# Patient Record
Sex: FEMALE | Race: WHITE | Employment: OTHER | URBAN - METROPOLITAN AREA
[De-identification: names, ages, dates, MRNs, and addresses within clinical notes are randomized per-mention and may not be internally consistent; named-entity substitution may affect disease eponyms.]

---

## 2017-04-06 ENCOUNTER — TELEPHONE (OUTPATIENT)
Dept: NEUROLOGY | Facility: CLINIC | Age: 82
End: 2017-04-06

## 2017-04-06 NOTE — TELEPHONE ENCOUNTER
Spoke with daughter Juan C Gruber [facility] [place] SCHWAB. Juan C Gruber states patient is in [place] and is returning the week of April 17 th.  Juan C Gruber notes patient has been complaining of facial pain a lot and was seen by a dentist in [facility]. Patient was told she may have

## 2017-04-25 ENCOUNTER — OFFICE VISIT (OUTPATIENT)
Dept: NEUROLOGY | Facility: CLINIC | Age: 82
End: 2017-04-25

## 2017-04-25 VITALS
SYSTOLIC BLOOD PRESSURE: 160 MMHG | BODY MASS INDEX: 28 KG/M2 | RESPIRATION RATE: 14 BRPM | WEIGHT: 160 LBS | HEART RATE: 84 BPM | DIASTOLIC BLOOD PRESSURE: 80 MMHG

## 2017-04-25 DIAGNOSIS — G50.0 TRIGEMINAL NEURALGIA: Primary | ICD-10-CM

## 2017-04-25 PROCEDURE — 99215 OFFICE O/P EST HI 40 MIN: CPT | Performed by: OTHER

## 2017-04-25 RX ORDER — CARBAMAZEPINE 100 MG/1
TABLET, CHEWABLE ORAL
Qty: 90 TABLET | Refills: 2 | Status: SHIPPED | OUTPATIENT
Start: 2017-04-25 | End: 2018-09-06

## 2017-04-25 RX ORDER — ATORVASTATIN CALCIUM 20 MG/1
10 TABLET, FILM COATED ORAL NIGHTLY
COMMUNITY
End: 2021-02-02 | Stop reason: ALTCHOICE

## 2017-04-25 RX ORDER — IBUPROFEN 800 MG/1
800 TABLET ORAL EVERY 8 HOURS PRN
COMMUNITY
End: 2021-02-02 | Stop reason: ALTCHOICE

## 2017-04-25 RX ORDER — AMOXICILLIN 500 MG/1
500 TABLET, FILM COATED ORAL EVERY 8 HOURS
COMMUNITY
End: 2021-02-02 | Stop reason: ALTCHOICE

## 2017-04-25 RX ORDER — ENALAPRIL MALEATE 5 MG/1
5 TABLET ORAL 2 TIMES DAILY
COMMUNITY
End: 2021-02-02 | Stop reason: ALTCHOICE

## 2017-04-25 NOTE — PROGRESS NOTES
[facility] Neurology outpatient progress note  Date of service: 4/25/2017    Patient here to follow up regarding trigeminal neuralgia.  present for visit. States pain has increased, but has been out of medication for approx 1 week.  Here to discuss the tobacco: Never Used    Alcohol Use: Yes  0.0 oz/week    0 Standard drinks or equivalent per week         Comment: rare     Family History   Problem Relation Age of Onset   • Cancer Other      Family Hx   Neurological examination:  /80 mmHg  Pulse 84

## 2017-04-25 NOTE — PROGRESS NOTES
Patient here to follow up regarding trigeminal neuralgia.  present for visit. States pain has increased, but has been out of medication for approx 1 week. Here to discuss the next steps.

## 2017-04-25 NOTE — PATIENT INSTRUCTIONS
Refill policies:    • Allow 2 business days for refills; controlled substances may take longer.   • Contact your pharmacy at least 5 days prior to running out of medication and have them send an electronic request or submit request through the “request re insurance carrier to obtain pre-certification or prior authorization. Unfortunately, [facility] has seen an increase in denial of payment even though the procedure/test has been pre-certified.   You are strongly encouraged to contact your insurance carrier to v

## 2017-06-28 ENCOUNTER — OFFICE VISIT (OUTPATIENT)
Dept: NEUROLOGY | Facility: CLINIC | Age: 82
End: 2017-06-28

## 2017-06-28 VITALS
BODY MASS INDEX: 30 KG/M2 | SYSTOLIC BLOOD PRESSURE: 128 MMHG | DIASTOLIC BLOOD PRESSURE: 70 MMHG | RESPIRATION RATE: 16 BRPM | WEIGHT: 170 LBS | HEART RATE: 78 BPM

## 2017-06-28 DIAGNOSIS — G50.0 TRIGEMINAL NEURALGIA: Primary | ICD-10-CM

## 2017-06-28 PROCEDURE — 99215 OFFICE O/P EST HI 40 MIN: CPT | Performed by: OTHER

## 2017-06-28 NOTE — PATIENT INSTRUCTIONS
Refill policies:    • Allow 2-3 business days for refills; controlled substances may take longer.   • Contact your pharmacy at least 5 days prior to running out of medication and have them send an electronic request or submit request through the [facility] have a procedure or additional testing performed. [facility]) will contact your insurance carrier to obtain pre-certification or prior authorization.     Unfortunately, [facility] has seen an increase in denial of payment even though the p

## 2017-06-28 NOTE — PROGRESS NOTES
[facility] Neurology outpatient progress note  Date of service: 6/28/2017    Patient here to follow up regarding trigeminal neuralgia.  utlized for visit. States pain has not improved. Here to discuss the next steps.   pain on the R side of her face-upp Smokeless tobacco: Never Used    Comment: Quit in 1976    Alcohol use Yes  0.6 oz/week    1 Standard drinks or equivalent per week         Comment: rare     Family History   Problem Relation Age of Onset   • Cancer Other      Family Hx   Neurological ex

## 2017-07-03 ENCOUNTER — HOSPITAL ENCOUNTER (OUTPATIENT)
Dept: MRI IMAGING | Age: 82
Discharge: HOME | End: 2017-07-03
Attending: Other
Payer: MEDICARE

## 2017-07-03 DIAGNOSIS — G50.0 TRIGEMINAL NEURALGIA: ICD-10-CM

## 2017-07-03 PROCEDURE — 70553 MRI BRAIN STEM W/O & W/DYE: CPT | Performed by: OTHER

## 2017-07-03 PROCEDURE — 70546 MR ANGIOGRAPH HEAD W/O&W/DYE: CPT | Performed by: OTHER

## 2017-07-03 PROCEDURE — A9575 INJ GADOTERATE MEGLUMI 0.1ML: HCPCS | Performed by: OTHER

## 2017-07-05 ENCOUNTER — OFFICE VISIT (OUTPATIENT)
Dept: SURGERY | Facility: CLINIC | Age: 82
End: 2017-07-05

## 2017-07-05 VITALS
WEIGHT: 170 LBS | HEIGHT: 64 IN | BODY MASS INDEX: 29.02 KG/M2 | SYSTOLIC BLOOD PRESSURE: 124 MMHG | DIASTOLIC BLOOD PRESSURE: 66 MMHG | HEART RATE: 86 BPM

## 2017-07-05 DIAGNOSIS — G50.0 TRIGEMINAL NEURALGIA: Primary | ICD-10-CM

## 2017-07-05 PROCEDURE — 99213 OFFICE O/P EST LOW 20 MIN: CPT | Performed by: PHYSICIAN ASSISTANT

## 2017-07-05 NOTE — PROGRESS NOTES
Intermittent facial pain over last 2-3 years has increased in frequency and is right sided facial pain. Had MVD in 2012 by Dr. Kerrie Watt and was pain free for 3 years. New MRI for review.

## 2017-07-05 NOTE — PROGRESS NOTES
Neurosurgery Clinic Visit  2017    Lali Han PCP:  Edilberto Andrade MD    1935 MRN [id number]       CC: Trigeminal Neuralgia    HPI:    Patient is a very pleasant 81year old female who presents with right facial pain.   She had right V2 pain d midline, facial sensation and expression symmetric, normal voice    Upper extremity strength:      Deltoid  Biceps  Triceps   W.flexion  W.extension    Finger abduction     Right 5 5 5 5  5 5 5     Left 5 5 5 5 5 5 5     Lower extremity strength:

## 2017-07-05 NOTE — PATIENT INSTRUCTIONS
Refill policies:    • Allow 2-3 business days for refills; controlled substances may take longer.   • Contact your pharmacy at least 5 days prior to running out of medication and have them send an electronic request or submit request through the [facility] have a procedure or additional testing performed. [facility]) will contact your insurance carrier to obtain pre-certification or prior authorization.     Unfortunately, [facility] has seen an increase in denial of payment even though the p

## 2018-06-18 ENCOUNTER — PATIENT MESSAGE (OUTPATIENT)
Dept: NEUROLOGY | Facility: CLINIC | Age: 83
End: 2018-06-18

## 2018-06-18 NOTE — TELEPHONE ENCOUNTER
Per Dr Washington Grade notes, 9/15/15,     HPI: Koffi Cedeno is a 79year old female with PMH of right trigeminal neuralgia s/p MVD in 2012 by Dr Nighat Rojas presents here for initial evaluation; Patient here for evaluation of trigeminal neuralgia that was origin

## 2018-06-18 NOTE — TELEPHONE ENCOUNTER
From: Radha Don  To: Emi Livingston MD  Sent: 6/18/2018 9:03 AM CDT  Subject: Non-Urgent Medical Question    Good morning,   Is there a possibility that you can tell me when my Trigeminal Neuralgia was 1st diagnosed.  I remember seeing Dr. Melvin Saba (no

## 2018-09-06 ENCOUNTER — TELEPHONE (OUTPATIENT)
Dept: NEUROLOGY | Facility: CLINIC | Age: 83
End: 2018-09-06

## 2018-09-06 DIAGNOSIS — G50.0 TRIGEMINAL NEURALGIA: ICD-10-CM

## 2018-09-06 RX ORDER — CARBAMAZEPINE 100 MG/1
TABLET, CHEWABLE ORAL
Qty: 90 TABLET | Refills: 0 | Status: SHIPPED | OUTPATIENT
Start: 2018-09-06 | End: 2020-11-10

## 2018-09-06 NOTE — TELEPHONE ENCOUNTER
Carbamazepine last refilled 4/25/17    Noted patient is overdue for a follow up appointment. Last seen 6/28/17. No future appts scheduled. Left a message for patient's daughter, Katerine Sharma to call back.      When returns call please help schedule a follow up

## 2018-09-06 NOTE — TELEPHONE ENCOUNTER
Medication: Carbamazepine 100 mg      Date of last refill: 04/25/17 (#90/2)  [place]    Last office visit: 6/28/17  Due back to clinic per last office note: 2-3 months   Date next office visit scheduled:  No future ap

## 2018-09-06 NOTE — TELEPHONE ENCOUNTER
Daughter verified it the # given for pharmacy is correct and it is for the Carbamazepine. Transferred to  to schedule follow up. Daughter verbalized understanding no further refills until follow up.

## 2018-09-06 NOTE — TELEPHONE ENCOUNTER
Rx for Carbamazepine sent to [facility]. Receipt confirmed by pharmacy (9/6/2018  4:11 PM CDT)

## 2018-12-05 ENCOUNTER — TELEPHONE (OUTPATIENT)
Dept: INTERNAL MEDICINE CLINIC | Facility: CLINIC | Age: 83
End: 2018-12-05

## 2018-12-05 NOTE — TELEPHONE ENCOUNTER
Spoke with daughter to set up medicare annual well visit. Daughter declined setting up an appointment. She stated that she found a new PCP in [facility]. Info given to Brigette Watkins supervisor to remove Dr. Harshil Denson as PCP.

## 2019-03-28 ENCOUNTER — MED REC SCAN ONLY (OUTPATIENT)
Dept: INTERNAL MEDICINE CLINIC | Facility: CLINIC | Age: 84
End: 2019-03-28

## 2020-10-30 ENCOUNTER — PATIENT MESSAGE (OUTPATIENT)
Dept: NEUROLOGY | Facility: CLINIC | Age: 85
End: 2020-10-30

## 2020-10-30 NOTE — TELEPHONE ENCOUNTER
From: Adis Hardy  To: Danni Palacios MD  Sent: 10/30/2020 10:17 AM CDT  Subject: Other    Hi, Adis Antony has an appt Nov. 3rd due to reoccurrence facial pain. She is coming in from [place] for this appt.  Is there any chance that you will be orderi

## 2020-10-30 NOTE — TELEPHONE ENCOUNTER
I have not seen pt over 3 years,i.e.. no longer a established pt, in our office that means a new consult is necessary, I prefer to evaluate pt first before I put in any orders.  Please feel free to contact your PCP or other specialists if they can help put

## 2020-11-02 NOTE — TELEPHONE ENCOUNTER
Called pt's daughter to update insurance information; also changed appt to 11/10/20 for an Established Exam 40 min with Dr. Eugenio Canada in [place] office. Pt's daughter appreciative, she is able to attend the visit with her mom.

## 2020-11-10 ENCOUNTER — HOSPITAL ENCOUNTER (OUTPATIENT)
Dept: MRI IMAGING | Facility: HOSPITAL | Age: 85
Discharge: HOME | End: 2020-11-10
Attending: Other
Payer: MEDICARE

## 2020-11-10 ENCOUNTER — OFFICE VISIT (OUTPATIENT)
Dept: NEUROLOGY | Facility: CLINIC | Age: 85
End: 2020-11-10
Payer: MEDICARE

## 2020-11-10 VITALS
RESPIRATION RATE: 14 BRPM | SYSTOLIC BLOOD PRESSURE: 138 MMHG | DIASTOLIC BLOOD PRESSURE: 78 MMHG | HEART RATE: 68 BPM | BODY MASS INDEX: 29 KG/M2 | WEIGHT: 170 LBS

## 2020-11-10 DIAGNOSIS — G50.0 TRIGEMINAL NEURALGIA: Primary | ICD-10-CM

## 2020-11-10 DIAGNOSIS — G50.0 TRIGEMINAL NEURALGIA: ICD-10-CM

## 2020-11-10 PROCEDURE — 82565 ASSAY OF CREATININE: CPT

## 2020-11-10 PROCEDURE — A9575 INJ GADOTERATE MEGLUMI 0.1ML: HCPCS | Performed by: OTHER

## 2020-11-10 PROCEDURE — 99204 OFFICE O/P NEW MOD 45 MIN: CPT | Performed by: OTHER

## 2020-11-10 PROCEDURE — 70546 MR ANGIOGRAPH HEAD W/O&W/DYE: CPT | Performed by: OTHER

## 2020-11-10 PROCEDURE — 70553 MRI BRAIN STEM W/O & W/DYE: CPT | Performed by: OTHER

## 2020-11-10 RX ORDER — CARBAMAZEPINE 100 MG/1
100 TABLET, EXTENDED RELEASE ORAL 3 TIMES DAILY
Qty: 90 TABLET | Refills: 2 | Status: SHIPPED | OUTPATIENT
Start: 2020-11-10 | End: 2020-11-10

## 2020-11-10 RX ORDER — LISINOPRIL 20 MG/1
10 TABLET ORAL DAILY
COMMUNITY
Start: 2019-05-31 | End: 2021-02-02 | Stop reason: ALTCHOICE

## 2020-11-10 RX ORDER — CARBAMAZEPINE 100 MG/1
100 TABLET, EXTENDED RELEASE ORAL 3 TIMES DAILY
Qty: 90 TABLET | Refills: 2 | Status: SHIPPED | OUTPATIENT
Start: 2020-11-10 | End: 2020-12-08

## 2020-11-10 NOTE — PROGRESS NOTES
[facility]    Date of consult: 11/10/2020    CC/Reason for consult: trigeminal neuralgia    HPI: Ricarda Lou is a 81year old female with PMH of right trigeminal neuralgia s/p MVD in 2012 by Dr Blayne Perez ; trigeminal neuralgia w Quit in 1976    Alcohol use:  Yes      Alcohol/week: 1.0 standard drinks      Types: 1 Standard drinks or equivalent per week      Comment: rare    Family History   Problem Relation Age of Onset   • Cancer Other         Family Hx      Physical Examination:

## 2020-12-07 ENCOUNTER — PATIENT MESSAGE (OUTPATIENT)
Dept: NEUROLOGY | Facility: CLINIC | Age: 85
End: 2020-12-07

## 2020-12-07 NOTE — TELEPHONE ENCOUNTER
Per L.O.V. 11/10/2020-    Assessment & Plan:  Trigeminal neuralgia  (primary encounter diagnosis); right ; worsening; right recurrent trigeminal neuralgia s/p MVD     Recommendations:  Restart tegretol 100 mg tid and may increase as needed  MRI /MRA trigem

## 2020-12-07 NOTE — TELEPHONE ENCOUNTER
From: Hugh Donohue  To: Fior Walsh MD  Sent: 12/7/2020 9:39 AM CST  Subject: Prescription Question    Good morning, My mom Hugh Jayde was instructed to call back in 3-4 weeks with an update on how the medication is working.   Per my mom, the m

## 2020-12-08 RX ORDER — CARBAMAZEPINE 100 MG/1
100 TABLET, EXTENDED RELEASE ORAL 4 TIMES DAILY
Qty: 120 TABLET | Refills: 2 | Status: SHIPPED | OUTPATIENT
Start: 2020-12-08 | End: 2021-02-02

## 2020-12-08 NOTE — TELEPHONE ENCOUNTER
Patient's daughter Shantel Patel notified (ok per HIPAA consent) that  Patient can take Carbamazepine 100mg QID.

## 2021-02-02 ENCOUNTER — OFFICE VISIT (OUTPATIENT)
Dept: NEUROLOGY | Facility: CLINIC | Age: 86
End: 2021-02-02
Payer: MEDICARE

## 2021-02-02 ENCOUNTER — OFFICE VISIT (OUTPATIENT)
Dept: INTERNAL MEDICINE CLINIC | Facility: CLINIC | Age: 86
End: 2021-02-02
Payer: MEDICARE

## 2021-02-02 VITALS
OXYGEN SATURATION: 98 % | WEIGHT: 168 LBS | HEIGHT: 62 IN | DIASTOLIC BLOOD PRESSURE: 80 MMHG | RESPIRATION RATE: 12 BRPM | HEART RATE: 67 BPM | SYSTOLIC BLOOD PRESSURE: 180 MMHG | BODY MASS INDEX: 30.91 KG/M2 | TEMPERATURE: 99 F

## 2021-02-02 VITALS
RESPIRATION RATE: 16 BRPM | WEIGHT: 168 LBS | SYSTOLIC BLOOD PRESSURE: 164 MMHG | BODY MASS INDEX: 29 KG/M2 | DIASTOLIC BLOOD PRESSURE: 78 MMHG | HEART RATE: 70 BPM

## 2021-02-02 DIAGNOSIS — E78.5 HYPERLIPIDEMIA, UNSPECIFIED HYPERLIPIDEMIA TYPE: ICD-10-CM

## 2021-02-02 DIAGNOSIS — I10 UNCONTROLLED HYPERTENSION: Primary | ICD-10-CM

## 2021-02-02 DIAGNOSIS — K14.6 TONGUE BURNING SENSATION: ICD-10-CM

## 2021-02-02 DIAGNOSIS — R20.2 NUMBNESS AND TINGLING OF RIGHT LOWER EXTREMITY: ICD-10-CM

## 2021-02-02 DIAGNOSIS — R20.0 NUMBNESS AND TINGLING OF RIGHT LOWER EXTREMITY: ICD-10-CM

## 2021-02-02 DIAGNOSIS — G50.0 TRIGEMINAL NEURALGIA OF RIGHT SIDE OF FACE: ICD-10-CM

## 2021-02-02 DIAGNOSIS — G50.0 TRIGEMINAL NEURALGIA: Primary | ICD-10-CM

## 2021-02-02 PROCEDURE — 99214 OFFICE O/P EST MOD 30 MIN: CPT | Performed by: OTHER

## 2021-02-02 PROCEDURE — 93000 ELECTROCARDIOGRAM COMPLETE: CPT | Performed by: PHYSICIAN ASSISTANT

## 2021-02-02 PROCEDURE — 99214 OFFICE O/P EST MOD 30 MIN: CPT | Performed by: PHYSICIAN ASSISTANT

## 2021-02-02 RX ORDER — CARBAMAZEPINE 100 MG/1
100 TABLET, EXTENDED RELEASE ORAL 3 TIMES DAILY
Qty: 270 TABLET | Refills: 3 | Status: SHIPPED | OUTPATIENT
Start: 2021-02-02

## 2021-02-02 RX ORDER — ATORVASTATIN CALCIUM 10 MG/1
10 TABLET, FILM COATED ORAL NIGHTLY
COMMUNITY

## 2021-02-02 RX ORDER — IBUPROFEN 200 MG
200 TABLET ORAL EVERY 6 HOURS PRN
COMMUNITY

## 2021-02-02 RX ORDER — LISINOPRIL 10 MG/1
20 TABLET ORAL DAILY
COMMUNITY
Start: 2021-02-02 | End: 2021-02-11

## 2021-02-02 RX ORDER — LISINOPRIL 10 MG/1
10 TABLET ORAL DAILY
COMMUNITY
End: 2021-02-02

## 2021-02-02 NOTE — PROGRESS NOTES
Courtney Deras is a 81year old female. HPI:   Patient presents for elevated BP. Accompanied today by her daughter who is translating.   patient spends much of the year living in [facility].  Recently home readings on wrist cuff have been consistent per week      Frequency: Monthly or less      Comment: rare    Drug use: No      Family History   Problem Relation Age of Onset   • Cancer Other         Family Hx        REVIEW OF SYSTEMS:   GENERAL HEALTH: denies fever, chills, night sweats  SKIN: denies folic acid. Neuro - Dr. Valdez Erp    The patient indicates understanding of these issues and agrees to the plan. The patient is asked to return here in 7-10 days for f/u of HTN.   Instructed to seek immediate attn if developing acute HA, new onset n

## 2021-02-02 NOTE — PROGRESS NOTES
[facility] Neurology outpatient progress note  Date of service: 2/2/2021    Patient here for a follow-up visit for right trigeminal neuralgia. Since last visit pain is about same, sometimes feels more electric sensation on right cheek.  Feels more when she is not Social History:  Social History    Tobacco Use      Smoking status: Former Smoker      Smokeless tobacco: Never Used      Tobacco comment: Quit in 1976    Alcohol use:  Yes      Alcohol/week: 1.0 standard drinks      Types: 1 Standard drinks or equivale /MRA trigeminal neuralgia protocol reviewed, overall stable study  PCP to follow re: elevated BP today, meds needed to be adjusted or go urgent care if not improving; advised pt and family  See orders and medications filed with this encounter.  The patient

## 2021-02-02 NOTE — PATIENT INSTRUCTIONS
High blood pressure:  - increase lisinopril to 20 mg daily (take TWO 10 mg tablets once a day)    Please use [employer] or call Suhail Solis at [phone] to set up the following tests:  - fasting blood work    Follow up visit in 1 wesiria

## 2021-02-03 DIAGNOSIS — Z23 NEED FOR VACCINATION: ICD-10-CM

## 2021-02-11 ENCOUNTER — LAB ENCOUNTER (OUTPATIENT)
Dept: LAB | Age: 86
End: 2021-02-11
Attending: FAMILY MEDICINE
Payer: MEDICARE

## 2021-02-11 ENCOUNTER — OFFICE VISIT (OUTPATIENT)
Dept: INTERNAL MEDICINE CLINIC | Facility: CLINIC | Age: 86
End: 2021-02-11
Payer: MEDICARE

## 2021-02-11 VITALS
TEMPERATURE: 98 F | DIASTOLIC BLOOD PRESSURE: 84 MMHG | HEART RATE: 87 BPM | OXYGEN SATURATION: 98 % | HEIGHT: 62 IN | SYSTOLIC BLOOD PRESSURE: 160 MMHG | BODY MASS INDEX: 31.32 KG/M2 | WEIGHT: 170.19 LBS | RESPIRATION RATE: 16 BRPM

## 2021-02-11 DIAGNOSIS — R20.0 NUMBNESS AND TINGLING OF RIGHT LEG: ICD-10-CM

## 2021-02-11 DIAGNOSIS — G50.0 TRIGEMINAL NEURALGIA OF RIGHT SIDE OF FACE: ICD-10-CM

## 2021-02-11 DIAGNOSIS — R20.2 NUMBNESS AND TINGLING OF RIGHT LEG: ICD-10-CM

## 2021-02-11 DIAGNOSIS — K14.6 TONGUE BURNING SENSATION: ICD-10-CM

## 2021-02-11 DIAGNOSIS — R20.2 NUMBNESS AND TINGLING OF RIGHT LOWER EXTREMITY: ICD-10-CM

## 2021-02-11 DIAGNOSIS — R20.0 NUMBNESS AND TINGLING OF RIGHT LOWER EXTREMITY: ICD-10-CM

## 2021-02-11 DIAGNOSIS — I10 UNCONTROLLED HYPERTENSION: ICD-10-CM

## 2021-02-11 DIAGNOSIS — M54.50 ACUTE BILATERAL LOW BACK PAIN WITHOUT SCIATICA: Primary | ICD-10-CM

## 2021-02-11 DIAGNOSIS — E78.5 HYPERLIPIDEMIA, UNSPECIFIED HYPERLIPIDEMIA TYPE: ICD-10-CM

## 2021-02-11 DIAGNOSIS — I10 BENIGN ESSENTIAL HYPERTENSION: ICD-10-CM

## 2021-02-11 LAB
ALT SERPL-CCNC: 22 U/L
ANION GAP SERPL CALC-SCNC: 2 MMOL/L (ref 0–18)
AST SERPL-CCNC: 17 U/L (ref 15–37)
BASOPHILS # BLD AUTO: 0.05 X10(3) UL (ref 0–0.2)
BASOPHILS NFR BLD AUTO: 0.9 %
BUN BLD-MCNC: 18 MG/DL (ref 7–18)
BUN/CREAT SERPL: 20.2 (ref 10–20)
CALCIUM BLD-MCNC: 9.4 MG/DL (ref 8.5–10.1)
CHLORIDE SERPL-SCNC: 111 MMOL/L (ref 98–112)
CHOLEST SMN-MCNC: 230 MG/DL (ref ?–200)
CO2 SERPL-SCNC: 27 MMOL/L (ref 21–32)
CREAT BLD-MCNC: 0.89 MG/DL
DEPRECATED RDW RBC AUTO: 40.6 FL (ref 35.1–46.3)
EOSINOPHIL # BLD AUTO: 0.15 X10(3) UL (ref 0–0.7)
EOSINOPHIL NFR BLD AUTO: 2.6 %
ERYTHROCYTE [DISTWIDTH] IN BLOOD BY AUTOMATED COUNT: 12 % (ref 11–15)
FOLATE SERPL-MCNC: 15 NG/ML (ref 8.7–?)
GLUCOSE BLD-MCNC: 95 MG/DL (ref 70–99)
HCT VFR BLD AUTO: 40.9 %
HDLC SERPL-MCNC: 72 MG/DL (ref 40–59)
HGB BLD-MCNC: 13.7 G/DL
IMM GRANULOCYTES # BLD AUTO: 0.02 X10(3) UL (ref 0–1)
IMM GRANULOCYTES NFR BLD: 0.3 %
LDLC SERPL CALC-MCNC: 130 MG/DL (ref ?–100)
LYMPHOCYTES # BLD AUTO: 1.44 X10(3) UL (ref 1–4)
LYMPHOCYTES NFR BLD AUTO: 24.7 %
MCH RBC QN AUTO: 31.1 PG (ref 26–34)
MCHC RBC AUTO-ENTMCNC: 33.5 G/DL (ref 31–37)
MCV RBC AUTO: 92.7 FL
MONOCYTES # BLD AUTO: 0.41 X10(3) UL (ref 0.1–1)
MONOCYTES NFR BLD AUTO: 7 %
NEUTROPHILS # BLD AUTO: 3.77 X10 (3) UL (ref 1.5–7.7)
NEUTROPHILS # BLD AUTO: 3.77 X10(3) UL (ref 1.5–7.7)
NEUTROPHILS NFR BLD AUTO: 64.5 %
NONHDLC SERPL-MCNC: 158 MG/DL (ref ?–130)
OSMOLALITY SERPL CALC.SUM OF ELEC: 292 MOSM/KG (ref 275–295)
PATIENT FASTING Y/N/NP: YES
PATIENT FASTING Y/N/NP: YES
PLATELET # BLD AUTO: 244 10(3)UL (ref 150–450)
POTASSIUM SERPL-SCNC: 4.5 MMOL/L (ref 3.5–5.1)
RBC # BLD AUTO: 4.41 X10(6)UL
SODIUM SERPL-SCNC: 140 MMOL/L (ref 136–145)
TRIGL SERPL-MCNC: 142 MG/DL (ref 30–149)
TSI SER-ACNC: 1.78 MIU/ML (ref 0.36–3.74)
VIT B12 SERPL-MCNC: 315 PG/ML (ref 193–986)
VLDLC SERPL CALC-MCNC: 28 MG/DL (ref 0–30)
WBC # BLD AUTO: 5.8 X10(3) UL (ref 4–11)

## 2021-02-11 PROCEDURE — 82607 VITAMIN B-12: CPT

## 2021-02-11 PROCEDURE — 84450 TRANSFERASE (AST) (SGOT): CPT

## 2021-02-11 PROCEDURE — 80048 BASIC METABOLIC PNL TOTAL CA: CPT

## 2021-02-11 PROCEDURE — 80061 LIPID PANEL: CPT

## 2021-02-11 PROCEDURE — 82746 ASSAY OF FOLIC ACID SERUM: CPT

## 2021-02-11 PROCEDURE — 99214 OFFICE O/P EST MOD 30 MIN: CPT | Performed by: PHYSICIAN ASSISTANT

## 2021-02-11 PROCEDURE — 36415 COLL VENOUS BLD VENIPUNCTURE: CPT

## 2021-02-11 PROCEDURE — 84460 ALANINE AMINO (ALT) (SGPT): CPT

## 2021-02-11 PROCEDURE — 85025 COMPLETE CBC W/AUTO DIFF WBC: CPT

## 2021-02-11 PROCEDURE — 84443 ASSAY THYROID STIM HORMONE: CPT

## 2021-02-11 RX ORDER — LISINOPRIL 10 MG/1
30 TABLET ORAL DAILY
Refills: 0 | COMMUNITY
Start: 2021-02-11 | End: 2021-06-22

## 2021-02-11 NOTE — PATIENT INSTRUCTIONS
High blood pressure:  - increase lisinopril to 30 mg each MORNING (take THREE 10 mg tablets)  *recommend [employer] brand cuff (upper arm -- NOT wrist cuff)    Back pain:  - ice / heat (10-15 minutes at a time)  - tylenol (acetaminophen) 1,000 mg every 8 hours

## 2021-02-11 NOTE — PROGRESS NOTES
Jonathan Sands is a 81year old female. HPI:   Patient presents for f/u of HTN. She is accompanied today by her daughter who is translating as pt is Scottish speaking. Dose of lisinopril increased to 20 mg last visit.   Tolerating this well, n drinks or equivalent per week      Frequency: Monthly or less      Comment: rare    Drug use: No      Family History   Problem Relation Age of Onset   • Cancer Other         Family Hx        REVIEW OF SYSTEMS:   GENERAL HEALTH: denies sig change in weight recurred. Following with neuro, on tegretol. # Burning sensation of tongue: check O67, folic acid.     Neuro - Dr. Mary Beth Valdez    The patient indicates understanding of these issues and agrees to the plan.   The patient is asked to return here in 10-14

## 2021-06-22 ENCOUNTER — PATIENT MESSAGE (OUTPATIENT)
Dept: INTERNAL MEDICINE CLINIC | Facility: CLINIC | Age: 86
End: 2021-06-22

## 2021-06-22 RX ORDER — LISINOPRIL 10 MG/1
30 TABLET ORAL DAILY
Qty: 30 TABLET | Refills: 0 | Status: CANCELLED | OUTPATIENT
Start: 2021-06-22

## 2021-06-22 NOTE — TELEPHONE ENCOUNTER
Medication(s) to Refill:   Requested Prescriptions     Pending Prescriptions Disp Refills   • lisinopril 10 MG Oral Tab 30 tablet 0     Sig: Take 3 tablets (30 mg total) by mouth daily.        LOV: 2-    RTC: 10-14 days for htn       Appointments for

## 2021-06-22 NOTE — TELEPHONE ENCOUNTER
From: Levi Alvarez  To: CHICHO Aaron  Sent: 6/22/2021 2:02 PM CDT  Subject: Prescription Question    Mike Hernandez,   My mother Courtney Deras is back from [place]) and is in need of her BP Medication.  I know she missed her follow-up

## 2021-06-23 RX ORDER — LISINOPRIL 10 MG/1
30 TABLET ORAL DAILY
Qty: 90 TABLET | Refills: 0 | Status: SHIPPED | OUTPATIENT
Start: 2021-06-23

## 2023-07-27 ENCOUNTER — PATIENT MESSAGE (OUTPATIENT)
Dept: NEUROLOGY | Facility: CLINIC | Age: 88
End: 2023-07-27

## 2023-07-27 NOTE — TELEPHONE ENCOUNTER
From: Alpa Hernandes  To: Telma Finley MD  Sent: 2023 4:09 PM CDT  Subject: Bear Cervantes  [birth year]    Hello, my mother is a patient or Brittany Khan. She is c/o of extreme facial pain. It is to the point where brushing her teeth or being in the sun is painful. She is currently in [place] and the soonest flight back to [facility] is  at [address] [facility]. I would like for her to be seen ASAP. Per your appointments scheduling, the sooner is late August and I would prefer that she does not wait that long. Can you please accommodate her ASAP? I know that taking her to the ER will not suffice as they will probably just prescribe pain meds and she needs to see her Neurologist. Please advice.    Jo Ramsay (daughter) [phone]

## 2023-08-03 ENCOUNTER — OFFICE VISIT (OUTPATIENT)
Dept: NEUROLOGY | Facility: CLINIC | Age: 88
End: 2023-08-03
Payer: MEDICARE

## 2023-08-03 VITALS
BODY MASS INDEX: 28 KG/M2 | DIASTOLIC BLOOD PRESSURE: 58 MMHG | SYSTOLIC BLOOD PRESSURE: 140 MMHG | WEIGHT: 155 LBS | RESPIRATION RATE: 16 BRPM | HEART RATE: 68 BPM | OXYGEN SATURATION: 96 %

## 2023-08-03 DIAGNOSIS — G50.0 TRIGEMINAL NEURALGIA: Primary | ICD-10-CM

## 2023-08-03 PROCEDURE — 99215 OFFICE O/P EST HI 40 MIN: CPT | Performed by: OTHER

## 2023-08-03 RX ORDER — CARBAMAZEPINE 100 MG/1
100 TABLET, EXTENDED RELEASE ORAL 3 TIMES DAILY
Qty: 270 TABLET | Refills: 3 | Status: SHIPPED | OUTPATIENT
Start: 2023-08-03

## 2023-08-03 RX ORDER — ENALAPRIL MALEATE 20 MG/1
20 TABLET ORAL NIGHTLY
COMMUNITY
Start: 2022-05-03

## 2023-08-03 RX ORDER — AMLODIPINE BESYLATE 5 MG/1
5 TABLET ORAL DAILY
COMMUNITY
Start: 2023-06-06

## 2023-08-03 RX ORDER — BENAZEPRIL HYDROCHLORIDE AND HYDROCHLOROTHIAZIDE 20; 12.5 MG/1; MG/1
1 TABLET ORAL DAILY
COMMUNITY

## 2023-10-09 ENCOUNTER — PATIENT MESSAGE (OUTPATIENT)
Dept: NEUROLOGY | Facility: CLINIC | Age: 88
End: 2023-10-09

## 2023-10-09 NOTE — TELEPHONE ENCOUNTER
From: Alpa Hernandes  To: Telma Finley  Sent: 10/9/2023 12:05 PM CDT  Subject: My Mother- Jose Iverson's Medication    Hi, my mother was asking of the medication is something that she needs to take for the rest of her life or can she stop and take it PRN?

## 2023-10-10 NOTE — TELEPHONE ENCOUNTER
It is her own choice whether to continue medication, taking it as needed usually arceo not have good result, but from neurological standpoint, I advise her to stay on medication, otherwise pain may not be controlled.

## 2023-10-25 ENCOUNTER — LAB ENCOUNTER (OUTPATIENT)
Dept: LAB | Age: 88
End: 2023-10-25
Attending: FAMILY MEDICINE

## 2023-10-25 ENCOUNTER — OFFICE VISIT (OUTPATIENT)
Dept: FAMILY MEDICINE CLINIC | Facility: CLINIC | Age: 88
End: 2023-10-25

## 2023-10-25 VITALS
DIASTOLIC BLOOD PRESSURE: 68 MMHG | WEIGHT: 167 LBS | HEIGHT: 62 IN | BODY MASS INDEX: 30.73 KG/M2 | RESPIRATION RATE: 20 BRPM | HEART RATE: 79 BPM | SYSTOLIC BLOOD PRESSURE: 170 MMHG | TEMPERATURE: 97 F

## 2023-10-25 DIAGNOSIS — I10 BENIGN ESSENTIAL HYPERTENSION: ICD-10-CM

## 2023-10-25 DIAGNOSIS — E78.5 HYPERLIPIDEMIA, UNSPECIFIED HYPERLIPIDEMIA TYPE: ICD-10-CM

## 2023-10-25 DIAGNOSIS — Z00.00 HEALTHCARE MAINTENANCE: ICD-10-CM

## 2023-10-25 LAB
ALBUMIN SERPL-MCNC: 3.9 G/DL (ref 3.4–5)
ALBUMIN/GLOB SERPL: 1.1 {RATIO} (ref 1–2)
ALP LIVER SERPL-CCNC: 102 U/L
ALT SERPL-CCNC: 22 U/L
ANION GAP SERPL CALC-SCNC: 5 MMOL/L (ref 0–18)
AST SERPL-CCNC: 17 U/L (ref 15–37)
BASOPHILS # BLD AUTO: 0.07 X10(3) UL (ref 0–0.2)
BASOPHILS NFR BLD AUTO: 1.3 %
BILIRUB SERPL-MCNC: 0.2 MG/DL (ref 0.1–2)
BUN BLD-MCNC: 17 MG/DL (ref 7–18)
CALCIUM BLD-MCNC: 9.7 MG/DL (ref 8.5–10.1)
CHLORIDE SERPL-SCNC: 111 MMOL/L (ref 98–112)
CHOLEST SERPL-MCNC: 229 MG/DL (ref ?–200)
CO2 SERPL-SCNC: 25 MMOL/L (ref 21–32)
CREAT BLD-MCNC: 1.07 MG/DL
EGFRCR SERPLBLD CKD-EPI 2021: 50 ML/MIN/1.73M2 (ref 60–?)
EOSINOPHIL # BLD AUTO: 0.17 X10(3) UL (ref 0–0.7)
EOSINOPHIL NFR BLD AUTO: 3.1 %
ERYTHROCYTE [DISTWIDTH] IN BLOOD BY AUTOMATED COUNT: 11.9 %
FASTING PATIENT LIPID ANSWER: NO
FASTING STATUS PATIENT QL REPORTED: NO
GLOBULIN PLAS-MCNC: 3.5 G/DL (ref 2.8–4.4)
GLUCOSE BLD-MCNC: 105 MG/DL (ref 70–99)
HCT VFR BLD AUTO: 39.8 %
HDLC SERPL-MCNC: 69 MG/DL (ref 40–59)
HGB BLD-MCNC: 13.8 G/DL
IMM GRANULOCYTES # BLD AUTO: 0.01 X10(3) UL (ref 0–1)
IMM GRANULOCYTES NFR BLD: 0.2 %
LDLC SERPL CALC-MCNC: 116 MG/DL (ref ?–100)
LYMPHOCYTES # BLD AUTO: 1.45 X10(3) UL (ref 1–4)
LYMPHOCYTES NFR BLD AUTO: 26.2 %
MCH RBC QN AUTO: 31.2 PG (ref 26–34)
MCHC RBC AUTO-ENTMCNC: 34.7 G/DL (ref 31–37)
MCV RBC AUTO: 90 FL
MONOCYTES # BLD AUTO: 0.38 X10(3) UL (ref 0.1–1)
MONOCYTES NFR BLD AUTO: 6.9 %
NEUTROPHILS # BLD AUTO: 3.46 X10 (3) UL (ref 1.5–7.7)
NEUTROPHILS # BLD AUTO: 3.46 X10(3) UL (ref 1.5–7.7)
NEUTROPHILS NFR BLD AUTO: 62.3 %
NONHDLC SERPL-MCNC: 160 MG/DL (ref ?–130)
OSMOLALITY SERPL CALC.SUM OF ELEC: 294 MOSM/KG (ref 275–295)
PLATELET # BLD AUTO: 253 10(3)UL (ref 150–450)
POTASSIUM SERPL-SCNC: 4.1 MMOL/L (ref 3.5–5.1)
PROT SERPL-MCNC: 7.4 G/DL (ref 6.4–8.2)
RBC # BLD AUTO: 4.42 X10(6)UL
SODIUM SERPL-SCNC: 141 MMOL/L (ref 136–145)
T4 FREE SERPL-MCNC: 0.8 NG/DL (ref 0.8–1.7)
TRIGL SERPL-MCNC: 257 MG/DL (ref 30–149)
TSI SER-ACNC: 2.18 MIU/ML (ref 0.36–3.74)
VLDLC SERPL CALC-MCNC: 45 MG/DL (ref 0–30)
WBC # BLD AUTO: 5.5 X10(3) UL (ref 4–11)

## 2023-10-25 PROCEDURE — 80061 LIPID PANEL: CPT

## 2023-10-25 PROCEDURE — 85025 COMPLETE CBC W/AUTO DIFF WBC: CPT

## 2023-10-25 PROCEDURE — 99214 OFFICE O/P EST MOD 30 MIN: CPT | Performed by: FAMILY MEDICINE

## 2023-10-25 PROCEDURE — 84439 ASSAY OF FREE THYROXINE: CPT

## 2023-10-25 PROCEDURE — 80053 COMPREHEN METABOLIC PANEL: CPT

## 2023-10-25 PROCEDURE — 84443 ASSAY THYROID STIM HORMONE: CPT

## 2023-10-25 RX ORDER — HYDROCHLOROTHIAZIDE 12.5 MG/1
12.5 TABLET ORAL DAILY
COMMUNITY
Start: 2023-09-22

## 2023-10-25 RX ORDER — AMLODIPINE BESYLATE 10 MG/1
10 TABLET ORAL DAILY
COMMUNITY
Start: 2023-08-31 | End: 2023-10-25

## 2023-10-25 RX ORDER — LOSARTAN POTASSIUM 100 MG/1
100 TABLET ORAL DAILY
Qty: 90 TABLET | Refills: 0 | Status: SHIPPED | OUTPATIENT
Start: 2023-10-25 | End: 2024-01-23

## 2023-10-25 RX ORDER — LOSARTAN POTASSIUM 50 MG/1
50 TABLET ORAL DAILY
COMMUNITY
Start: 2023-08-31 | End: 2023-10-25 | Stop reason: ALTCHOICE

## 2023-10-25 RX ORDER — AMLODIPINE BESYLATE 10 MG/1
10 TABLET ORAL DAILY
Qty: 90 TABLET | Refills: 0 | Status: SHIPPED | OUTPATIENT
Start: 2023-10-25

## 2023-10-25 RX ORDER — HYDROCHLOROTHIAZIDE 12.5 MG/1
12.5 TABLET ORAL DAILY
Qty: 90 TABLET | Refills: 0 | Status: CANCELLED | OUTPATIENT
Start: 2023-10-25

## 2023-10-25 RX ORDER — ATORVASTATIN CALCIUM 10 MG/1
10 TABLET, FILM COATED ORAL NIGHTLY
Qty: 90 TABLET | Refills: 0 | Status: SHIPPED | OUTPATIENT
Start: 2023-10-25

## 2023-10-25 NOTE — PROGRESS NOTES
[facility] Family Medicine Office Note  Chief Complaint:   Patient presents with:  Cough: Pt c/o sx for about 6 months  Sore Throat      HPI:   This is a 81year old female coming in for cough that has been ongoing for the last 6 months. The patient was previously on lisinopril she states that she recently did take the lisinopril yesterday. States that the cough has been dry has not been productive. She does have a history of hypertension as well as hyperlipidemia. She denies any headache denies any chest pain shortness of breath. 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     Social History:  Social History     Socioeconomic History    Marital status:    Tobacco Use    Smoking status: Former    Smokeless tobacco: Never    Tobacco comments:     Quit in 1976   Vaping Use    Vaping Use: Never used   Substance and Sexual Activity    Alcohol use: Yes     Comment: Occasionally    Drug use: No   Other Topics Concern    Caffeine Concern No    Stress Concern No    Weight Concern No    Special Diet No    Exercise No    Seat Belt No     Family History:  Family History   Problem Relation Age of Onset    Cancer Other         Family Hx     Allergies:    Aspirin                 NAUSEA AND VOMITING  Current Meds:  Current Outpatient Medications   Medication Sig Dispense Refill    hydroCHLOROthiazide 12.5 MG Oral Tab Take 1 tablet (12.5 mg total) by mouth daily. amLODIPine 10 MG Oral Tab Take 1 tablet (10 mg total) by mouth daily. 90 tablet 0    atorvastatin 10 MG Oral Tab Take 1 tablet (10 mg total) by mouth nightly.  90 tablet 0    losartan 100 MG Oral Tab Take 1 tablet (100 mg total) by mouth daily. 90 tablet 0    carBAMazepine  MG Oral Tablet 12 Hr Take 1 tablet (100 mg total) by mouth in the morning, at noon, and at bedtime. 270 tablet 3      Counseling given: Not Answered  Tobacco comments: Quit in [address]:   Consitutional: No fevers, chills, sweats  Eye: No recent visual problems  ENMT: No ear pain nasal congestion sore throat  Respiratory: No shortness of breath, positive cough  Cardiovascular: No chest pain palpitations syncope  Gastrointestinal: No nausea vomiting diarrhea  Genitourinary: No hematuria  Hema/Lymph no bruising tendency, swollen lymph glands  Endocrine: Negative for excessive thirst excessive hunger      Medical, surgical, family, and social histories were reviewed      EXAM:   VITALS:    10/25/23  1456   BP: (!) 170/68   Pulse:    Resp:    Temp:       GENERAL: well developed, well nourished, in no apparent distress  SKIN: no rashes, no suspicious lesions: Cool and Dry  HEENT: atraumatic, normocephalic, ears and throat are clear  Ears: TM's clear and visible bilaterally, no excess cerumen or erythema. EYES: Pupils equal round and reactive. Extraocular motions intact no scleral icterus no injection or drainage  THROAT without erythema tonsillar hypertrophy or exudate. Uvula midline airway patent  NECK: Given midline. No JVD or lymphadenopathy supple nontender no meningeal signs   LUNGS: clear to auscultation sounds equal bilaterally no wheezes rales or rhonchi  CARDIO: Regular rate and rhythm without murmurs gallops or rubs    ASSESSMENT AND PLAN:   1. Benign essential hypertension  - amLODIPine 10 MG Oral Tab; Take 1 tablet (10 mg total) by mouth daily. Dispense: 90 tablet; Refill: 0  - losartan 100 MG Oral Tab; Take 1 tablet (100 mg total) by mouth daily. Dispense: 90 tablet; Refill: 0      I did discuss with the patient that it would take about 2 weeks for her to have improvement with a cough since she was on lisinopril.   At this point we will be having her continue with the amlodipine 10 mg once a day she was given a prescription for this as well as adding on losartan 100 mg once a day and she was asked to take the medications as prescribed and follow-up in the next 10 days to recheck her blood pressure          2. Hyperlipidemia, unspecified hyperlipidemia type  - atorvastatin 10 MG Oral Tab; Take 1 tablet (10 mg total) by mouth nightly. Dispense: 90 tablet; Refill: 0  - Lipid Panel; Future  - Comp Metabolic Panel (14); Future  - CBC With Differential With Platelet; Future  - TSH and Free T4; Future      I did discuss with the patient at this time we would need to update blood work she need a fasting lipid panel CBC CMP free T4 free T3 TSH will be continuing with the atorvastatin 10 mg once a day she was asked to watch her diet decreasing fatty food fried food intake  appointment for establishing care to ensure that she is up-to-date with all preventative measures for her age. 3. Healthcare maintenance     I did discuss with the patient she would need to follow-up to make an   appointment  to establish care to ensure that she is up-to-date with all preventative measures for her age. Meds & Refills for this Visit:  Requested Prescriptions     Signed Prescriptions Disp Refills    amLODIPine 10 MG Oral Tab 90 tablet 0     Sig: Take 1 tablet (10 mg total) by mouth daily. atorvastatin 10 MG Oral Tab 90 tablet 0     Sig: Take 1 tablet (10 mg total) by mouth nightly. losartan 100 MG Oral Tab 90 tablet 0     Sig: Take 1 tablet (100 mg total) by mouth daily. Health Maintenance:  Zoster Vaccines(1 of 2) Never done  Pneumococcal Vaccine: 65+ Years(2 - PCV) due on 12/01/2012  Annual Physical due on 04/19/2019  COVID-19 Vaccine(3 - 2023-24 season) due on 09/01/2023  Influenza Vaccine(1) due on 10/01/2023    Patient/Caregiver Education: Patient/Caregiver Education: There are no barriers to learning. Medical education done.    Outcome: Patient verbalizes understanding. Patient is notified to call with any questions, complications, allergies, or worsening or changing symptoms. Patient is to call with any side effects or complications from the treatments as a result of today. Problem List:  Patient Active Problem List:     Abnormal x-ray     Acute upper respiratory infections of unspecified site     Benign essential hypertension     Trigeminal neuralgia     Hyperlipidemia        No follow-ups on file. Ricardo Driscoll MD    Please note that portions of this note may have been completed with a voice recognition program. Efforts were made to edit the dictations but occasionally words are mis-transcribed.

## 2023-11-03 ENCOUNTER — OFFICE VISIT (OUTPATIENT)
Dept: FAMILY MEDICINE CLINIC | Facility: CLINIC | Age: 88
End: 2023-11-03
Payer: MEDICARE

## 2023-11-03 VITALS
SYSTOLIC BLOOD PRESSURE: 168 MMHG | TEMPERATURE: 97 F | DIASTOLIC BLOOD PRESSURE: 60 MMHG | RESPIRATION RATE: 20 BRPM | WEIGHT: 167 LBS | HEART RATE: 63 BPM | HEIGHT: 62 IN | BODY MASS INDEX: 30.73 KG/M2

## 2023-11-03 DIAGNOSIS — Z23 NEED FOR VACCINATION: ICD-10-CM

## 2023-11-03 DIAGNOSIS — E78.2 MIXED HYPERLIPIDEMIA: ICD-10-CM

## 2023-11-03 DIAGNOSIS — I10 BENIGN ESSENTIAL HYPERTENSION: ICD-10-CM

## 2023-11-03 DIAGNOSIS — Z23 ENCOUNTER FOR IMMUNIZATION: ICD-10-CM

## 2023-11-03 PROCEDURE — 90662 IIV NO PRSV INCREASED AG IM: CPT | Performed by: FAMILY MEDICINE

## 2023-11-03 PROCEDURE — G0008 ADMIN INFLUENZA VIRUS VAC: HCPCS | Performed by: FAMILY MEDICINE

## 2023-11-03 PROCEDURE — G0009 ADMIN PNEUMOCOCCAL VACCINE: HCPCS | Performed by: FAMILY MEDICINE

## 2023-11-03 PROCEDURE — 90677 PCV20 VACCINE IM: CPT | Performed by: FAMILY MEDICINE

## 2023-11-03 PROCEDURE — 99214 OFFICE O/P EST MOD 30 MIN: CPT | Performed by: FAMILY MEDICINE

## 2023-11-29 ENCOUNTER — TELEPHONE (OUTPATIENT)
Dept: FAMILY MEDICINE CLINIC | Facility: CLINIC | Age: 88
End: 2023-11-29

## 2023-11-29 ENCOUNTER — PATIENT MESSAGE (OUTPATIENT)
Dept: FAMILY MEDICINE CLINIC | Facility: CLINIC | Age: 88
End: 2023-11-29

## 2023-11-29 NOTE — TELEPHONE ENCOUNTER
Received page regarding positive covid diagnosis after Thanksgiving. Patient's daughter also has covid. She tested positive today. She has fatigue, decreased appetite, headache. Advised to go to ER if chest pain, shortness of breath, unable to keep down fluids, or other concerning symptoms. They are interested in paxlovid and wondering if Dr. Luz Styles would send the rx. Will send to Dr. Luz Styles to review. Thank you.

## 2023-11-30 ENCOUNTER — TELEMEDICINE (OUTPATIENT)
Dept: FAMILY MEDICINE CLINIC | Facility: CLINIC | Age: 88
End: 2023-11-30
Payer: MEDICARE

## 2023-11-30 ENCOUNTER — TELEPHONE (OUTPATIENT)
Dept: FAMILY MEDICINE CLINIC | Facility: CLINIC | Age: 88
End: 2023-11-30

## 2023-11-30 DIAGNOSIS — U07.1 COVID-19: Primary | ICD-10-CM

## 2023-11-30 RX ORDER — FLUTICASONE PROPIONATE 50 MCG
1 SPRAY, SUSPENSION (ML) NASAL 2 TIMES DAILY
Qty: 1 EACH | Refills: 0 | Status: SHIPPED | OUTPATIENT
Start: 2023-11-30 | End: 2024-11-24

## 2023-11-30 RX ORDER — BENZONATATE 200 MG/1
200 CAPSULE ORAL 3 TIMES DAILY PRN
Qty: 40 CAPSULE | Refills: 0 | Status: SHIPPED | OUTPATIENT
Start: 2023-11-30

## 2023-11-30 NOTE — TELEPHONE ENCOUNTER
Spoke to Jenae Rosado and she states her mother lives in [facility] and she lives out here. She is going to contact her brother who lives with the pt to see if they have the capability to do a video visit.

## 2023-11-30 NOTE — TELEPHONE ENCOUNTER
T.C. to pt. I spoke with her daughter Shahid Thao and instructed her to make sure [facility] holds the carbamazepine 100 mg while on the paxlovid.  Daughter  Agreed to plan and verbalized understanding

## 2023-11-30 NOTE — PROGRESS NOTES
Virtual/Telephone Check-In    Jeremy Vega verbally consents to a Virtual/Telephone Check-In service on 11/30/23. Patient understands and accepts financial responsibility for any deductible, co-insurance and/or co-pays associated with this service. This visit is conducted using Telemedicine with live audio. I returned Jeremy Vega call by secure telephone chat, verified date of birth, and discussed their current concerns:     Due to testing positive for COVID-19 yesterday. The patient states that within the last 2 days she has had some increased cough congestion feeling fatigue as well as not wanting to eat. She denies any fever denies any shortness of breath at this time. Review of Systems:   Ten point review of systems has been performed and is otherwise negative and/or non-contributory, except as described above. Current Outpatient Medications   Medication Sig Dispense Refill    nirmatrelvir-ritonavir 300-100 MG Oral Tablet Therapy Pack Take two nirmatrelvir tablets (300mg) with one ritonavir tablet (100mg) together twice daily for 5 days. 30 tablet 0    benzonatate 200 MG Oral Cap Take 1 capsule (200 mg total) by mouth 3 (three) times daily as needed for cough. 40 capsule 0    fluticasone propionate 50 MCG/ACT Nasal Suspension 1 spray by Each Nare route in the morning and 1 spray before bedtime. 1 each 0    hydroCHLOROthiazide 12.5 MG Oral Tab Take 1 tablet (12.5 mg total) by mouth daily. amLODIPine 10 MG Oral Tab Take 1 tablet (10 mg total) by mouth daily. 90 tablet 0    atorvastatin 10 MG Oral Tab Take 1 tablet (10 mg total) by mouth nightly. 90 tablet 0    losartan 100 MG Oral Tab Take 1 tablet (100 mg total) by mouth daily. 90 tablet 0    carBAMazepine  MG Oral Tablet 12 Hr Take 1 tablet (100 mg total) by mouth in the morning, at noon, and at bedtime.  270 tablet 3     Patient Active Problem List   Diagnosis    Abnormal x-ray    Acute upper respiratory infections of unspecified site    Benign essential hypertension    Trigeminal neuralgia    Hyperlipidemia       Physical Exam:  General: ill -appearing individual in no apparent distress    HEENT: Head appears NCAT  Extra-ocular movements grossly intact  Eyes appear healthy, non-injected, no exudate or discharge  Nares no discharge  Oropharynx appears normal  Mouth has moist mucus membranes    Neck: Neck appears supple  no hesitation with movement of the neck  no cervical lymphadenopathy    Cardiovascular: hands and fingers are pink and well-perfused    Lungs: comfortable respirations  Positive  cough  no tachypnea  no retractions appreciated  no wheezing appreciated      Diagnosis:  1. COVID-19  - nirmatrelvir-ritonavir 300-100 MG Oral Tablet Therapy Pack; Take two nirmatrelvir tablets (300mg) with one ritonavir tablet (100mg) together twice daily for 5 days. Dispense: 30 tablet; Refill: 0  - benzonatate 200 MG Oral Cap; Take 1 capsule (200 mg total) by mouth 3 (three) times daily as needed for cough. Dispense: 40 capsule; Refill: 0  - fluticasone propionate 50 MCG/ACT Nasal Suspension; 1 spray by Each Nare route in the morning and 1 spray before bedtime. Dispense: 1 each; Refill: 0  I did discuss with the patient at this time that we would go ahead and start her on Paxlovid she was asked to use 3 pills twice a day for 5 days. They were asked to discontinue the carbamazepine while she is on this medication. She was asked to quarantine for the next 5 days as well as to wear her mask 5 days or after she was also given a prescription for Flonase as well as Tessalon Perles to help with the cough she was asked to follow-up if she has any continued symptoms. Follow up: prn  Duration of service, in minutes: 15  Patient also advised to follow CDC guidelines for self isolation/social distancing and symptomatic treatment as outlined on CDC Patient Guidelines. Ricardo Barnhart MD

## 2023-12-01 ENCOUNTER — MOBILE ENCOUNTER (OUTPATIENT)
Dept: FAMILY MEDICINE CLINIC | Facility: CLINIC | Age: 88
End: 2023-12-01

## 2023-12-01 ENCOUNTER — TELEPHONE (OUTPATIENT)
Dept: FAMILY MEDICINE CLINIC | Facility: CLINIC | Age: 88
End: 2023-12-01

## 2023-12-01 DIAGNOSIS — U07.1 COVID-19: Primary | ICD-10-CM

## 2023-12-01 NOTE — TELEPHONE ENCOUNTER
Rx was re-sent to preferred pharmacy. Pt's daughter voiced understanding and no further concern at this time.

## 2023-12-01 NOTE — TELEPHONE ENCOUNTER
nirmatrelvir-ritonavir 300-100 MG Oral Tablet Therapy Pack   Was out of stock at pharmacy originally sent to,    Daughter requesting rx be resent to:  [id number], [address] [place], [phone], [phone]     Requesting rx ASAP.

## 2024-01-18 ENCOUNTER — PATIENT OUTREACH (OUTPATIENT)
Dept: FAMILY MEDICINE CLINIC | Facility: CLINIC | Age: 89
End: 2024-01-18

## 2024-02-28 ENCOUNTER — PATIENT MESSAGE (OUTPATIENT)
Dept: FAMILY MEDICINE CLINIC | Facility: CLINIC | Age: 89
End: 2024-02-28

## 2024-02-29 NOTE — TELEPHONE ENCOUNTER
From: April Ha  To: Ricardo Sanchez  Sent: 2024 11:59 AM CST  Subject: Mom- April Iverson ( [birth year])    Hello- mom is currently in [place] but c/o facial pain. I have reached out to Dr. Hendricks (neurologist) to see what his action plan may be.   She is also c/o body aches, leg pain, you name it, she's got it.   She started seeing new physicians in [place] and told them that per her doctor in [place] (you- Dr. Sanchez) they are NOT to change her BP meds.   She wants to come down to see you and make sure everything is ok. I told her I can schedule her for a complete physical, but she can't come in c/o about this and that. I think she forgets she's 89 years old. Should I schedule her for a physical or just a follow-up appointment. Pls consult with Dr. Sanchez. If you need to talk to me personally, my # is [phone]  Ada

## 2024-05-13 ENCOUNTER — TELEPHONE (OUTPATIENT)
Dept: NEUROLOGY | Facility: CLINIC | Age: 89
End: 2024-05-13

## 2024-05-31 ENCOUNTER — OFFICE VISIT (OUTPATIENT)
Dept: FAMILY MEDICINE CLINIC | Facility: CLINIC | Age: 89
End: 2024-05-31

## 2024-05-31 ENCOUNTER — LAB ENCOUNTER (OUTPATIENT)
Dept: LAB | Age: 89
End: 2024-05-31
Attending: FAMILY MEDICINE
Payer: MEDICARE

## 2024-05-31 ENCOUNTER — OFFICE VISIT (OUTPATIENT)
Dept: NEUROLOGY | Facility: CLINIC | Age: 89
End: 2024-05-31

## 2024-05-31 VITALS
BODY MASS INDEX: 30 KG/M2 | HEART RATE: 70 BPM | RESPIRATION RATE: 16 BRPM | DIASTOLIC BLOOD PRESSURE: 64 MMHG | WEIGHT: 165.63 LBS | SYSTOLIC BLOOD PRESSURE: 142 MMHG

## 2024-05-31 VITALS
RESPIRATION RATE: 18 BRPM | HEART RATE: 76 BPM | DIASTOLIC BLOOD PRESSURE: 62 MMHG | WEIGHT: 164 LBS | TEMPERATURE: 98 F | BODY MASS INDEX: 30.18 KG/M2 | HEIGHT: 62 IN | SYSTOLIC BLOOD PRESSURE: 154 MMHG

## 2024-05-31 DIAGNOSIS — E78.5 HYPERLIPIDEMIA, UNSPECIFIED HYPERLIPIDEMIA TYPE: ICD-10-CM

## 2024-05-31 DIAGNOSIS — Z00.00 MEDICARE ANNUAL WELLNESS VISIT, SUBSEQUENT: ICD-10-CM

## 2024-05-31 DIAGNOSIS — E78.2 MIXED HYPERLIPIDEMIA: ICD-10-CM

## 2024-05-31 DIAGNOSIS — I10 BENIGN ESSENTIAL HYPERTENSION: ICD-10-CM

## 2024-05-31 DIAGNOSIS — G50.0 TRIGEMINAL NEURALGIA: ICD-10-CM

## 2024-05-31 DIAGNOSIS — G50.0 TRIGEMINAL NEURALGIA: Primary | ICD-10-CM

## 2024-05-31 LAB
ALBUMIN SERPL-MCNC: 3.9 G/DL (ref 3.4–5)
ALBUMIN/GLOB SERPL: 1.2 {RATIO} (ref 1–2)
ALP LIVER SERPL-CCNC: 128 U/L
ALT SERPL-CCNC: 21 U/L
ANION GAP SERPL CALC-SCNC: 6 MMOL/L (ref 0–18)
AST SERPL-CCNC: 19 U/L (ref 15–37)
BILIRUB SERPL-MCNC: 0.5 MG/DL (ref 0.1–2)
BUN BLD-MCNC: 16 MG/DL (ref 9–23)
CALCIUM BLD-MCNC: 9.2 MG/DL (ref 8.5–10.1)
CHLORIDE SERPL-SCNC: 109 MMOL/L (ref 98–112)
CHOLEST SERPL-MCNC: 170 MG/DL (ref ?–200)
CO2 SERPL-SCNC: 25 MMOL/L (ref 21–32)
CREAT BLD-MCNC: 1.04 MG/DL
EGFRCR SERPLBLD CKD-EPI 2021: 52 ML/MIN/1.73M2 (ref 60–?)
FASTING PATIENT LIPID ANSWER: YES
FASTING STATUS PATIENT QL REPORTED: YES
GLOBULIN PLAS-MCNC: 3.3 G/DL (ref 2.8–4.4)
GLUCOSE BLD-MCNC: 92 MG/DL (ref 70–99)
HDLC SERPL-MCNC: 67 MG/DL (ref 40–59)
LDLC SERPL CALC-MCNC: 80 MG/DL (ref ?–100)
NONHDLC SERPL-MCNC: 103 MG/DL (ref ?–130)
OSMOLALITY SERPL CALC.SUM OF ELEC: 291 MOSM/KG (ref 275–295)
POTASSIUM SERPL-SCNC: 4.4 MMOL/L (ref 3.5–5.1)
PROT SERPL-MCNC: 7.2 G/DL (ref 6.4–8.2)
SODIUM SERPL-SCNC: 140 MMOL/L (ref 136–145)
TRIGL SERPL-MCNC: 133 MG/DL (ref 30–149)
VLDLC SERPL CALC-MCNC: 21 MG/DL (ref 0–30)

## 2024-05-31 PROCEDURE — 99214 OFFICE O/P EST MOD 30 MIN: CPT | Performed by: OTHER

## 2024-05-31 PROCEDURE — 80061 LIPID PANEL: CPT

## 2024-05-31 PROCEDURE — 80053 COMPREHEN METABOLIC PANEL: CPT

## 2024-05-31 RX ORDER — DOXYCYCLINE HYCLATE 100 MG
100 TABLET ORAL 2 TIMES DAILY
COMMUNITY
End: 2024-05-31 | Stop reason: ALTCHOICE

## 2024-05-31 RX ORDER — LOSARTAN POTASSIUM 100 MG/1
100 TABLET ORAL DAILY
Qty: 90 TABLET | Refills: 0 | Status: SHIPPED | OUTPATIENT
Start: 2024-05-31 | End: 2024-08-29

## 2024-05-31 RX ORDER — CARBAMAZEPINE 100 MG/1
100 TABLET, EXTENDED RELEASE ORAL 3 TIMES DAILY
Qty: 270 TABLET | Refills: 3 | Status: SHIPPED | OUTPATIENT
Start: 2024-05-31

## 2024-05-31 RX ORDER — AMLODIPINE BESYLATE 10 MG/1
10 TABLET ORAL DAILY
Qty: 90 TABLET | Refills: 0 | Status: SHIPPED | OUTPATIENT
Start: 2024-05-31

## 2024-05-31 RX ORDER — ATORVASTATIN CALCIUM 10 MG/1
10 TABLET, FILM COATED ORAL NIGHTLY
Qty: 90 TABLET | Refills: 0 | Status: SHIPPED | OUTPATIENT
Start: 2024-05-31

## 2024-05-31 RX ORDER — LOSARTAN POTASSIUM 100 MG/1
100 TABLET ORAL DAILY
COMMUNITY
Start: 2024-05-23 | End: 2024-05-31

## 2024-05-31 RX ORDER — OMEPRAZOLE 40 MG/1
40 CAPSULE, DELAYED RELEASE ORAL DAILY
COMMUNITY
End: 2024-05-31 | Stop reason: ALTCHOICE

## 2024-05-31 RX ORDER — LISINOPRIL 40 MG/1
1 TABLET ORAL EVERY MORNING
COMMUNITY
End: 2024-05-31 | Stop reason: ALTCHOICE

## 2024-05-31 NOTE — PATIENT INSTRUCTIONS
Refill policies:    Allow 2-3 business days for refills; controlled substances may take longer.  Contact your pharmacy at least 5 days prior to running out of medication and have them send an electronic request or submit request through the “request refill” option in your [employer] account.  Refills are not addressed on weekends; covering physicians do not authorize routine medications on weekends.  No narcotics or controlled substances are refilled after noon on Fridays or by on call physicians.  By law, narcotics must be electronically prescribed.  A 30 day supply with no refills is the maximum allowed.  If your prescription is due for a refill, you may be due for a follow up appointment.  To best provide you care, patients receiving routine medications need to be seen at least once a year.  Patients receiving narcotic/controlled substance medications need to be seen at least once every 3 months.  In the event that your preferred pharmacy does not have the requested medication in stock (e.g. Backordered), it is your responsibility to find another pharmacy that has the requested medication available.  We will gladly send a new prescription to that pharmacy at your request.    Scheduling Tests:    If your physician has ordered radiology tests such as MRI or CT scans, please contact Central Scheduling at [phone] right away to schedule the test.  Once scheduled, the [facility] Centralized Referral Team will work with your insurance carrier to obtain pre-certification or prior authorization.  Depending on your insurance carrier, approval may take 3-10 days.  It is highly recommended patients assure they have received an authorization before having a test performed.  If test is done without insurance authorization, patient may be responsible for the entire amount billed.      Precertification and Prior Authorizations:  If your physician has recommended that you have a procedure or additional testing performed the [facility]  Centralized Referral Team will contact your insurance carrier to obtain pre-certification or prior authorization.    You are strongly encouraged to contact your insurance carrier to verify that your procedure/test has been approved and is a COVERED benefit.  Although the [facility] Centralized Referral Team does its due diligence, the insurance carrier gives the disclaimer that \"Although the procedure is authorized, this does not guarantee payment.\"    Ultimately the patient is responsible for payment.   Thank you for your understanding in this matter.  Paperwork Completion:  If you require FMLA or disability paperwork for your recovery, please make sure to either drop it off or have it faxed to our office at [phone]. Be sure the form has your name and date of birth on it.  The form will be faxed to our Forms Department and they will complete it for you.  There is a 25$ fee for all forms that need to be filled out.  Please be aware there is a 10-14 day turnaround time.  You will need to sign a release of information (HALI) form if your paperwork does not come with one.  You may call the Forms Department with any questions at [phone].  Their fax number is [phone].

## 2024-05-31 NOTE — PROGRESS NOTES
Subjective:   April Ha is a 89 year old female who presents for a Medicare Subsequent Annual Wellness visit (Pt already had Initial Annual Wellness) and scheduled follow up of multiple significant but stable problems.   Patient has a past medical history of hypertension hyperlipidemia as well as trigeminal neuralgia the patient has been following up with neurology for this.  She is currently on carbamazepine.  She states that she has not taking her blood pressure medications this morning.  She has not had any headaches chest pain shortness of breath.    History/Other:   Fall Risk Assessment:   She has been screened for Falls and is low risk.      Cognitive Assessment:   Abnormal  What day of the week is this?: Correct  What month is it?: Correct  What year is it?: Correct  Recall \"Ball\": Correct  Recall \"Flag\": Incorrect  Recall \"Tree\": Correct    Functional Ability/Status:   April Ha has some abnormal functions as listed below:  She has Driving difficulties based on screening of functional status. She has Hearing problems based on screening of functional status.She has Vision problems based on screening of functional status. She has problems with Memory based on screening of functional status.       Depression Screening (PHQ-2/PHQ-9): PHQ-2 SCORE: 0  , done 5/31/2024       Advanced Directives:   She does NOT have a Living Will. [Do you have a living will?: No]  She does NOT have a Power of  for Health Care. [Do you have a healthcare power of ?: No]  Discussed Advance Care Planning with patient (and family/surrogate if present). Standard forms made available to patient in After Visit Summary.      Patient Active Problem List   Diagnosis    Abnormal x-ray    Benign essential hypertension    Trigeminal neuralgia    Hyperlipidemia     Allergies:  She has No Known Allergies.    Current Medications:  Outpatient Medications Marked as Taking for the 5/31/24 encounter (Office  Visit) with Ricardo Sanchez MD   Medication Sig    carBAMazepine  MG Oral Tablet 12 Hr Take 1 tablet (100 mg total) by mouth in the morning, at noon, and at bedtime.    amLODIPine 10 MG Oral Tab Take 1 tablet (10 mg total) by mouth daily.    losartan 100 MG Oral Tab Take 1 tablet (100 mg total) by mouth daily.    atorvastatin 10 MG Oral Tab Take 1 tablet (10 mg total) by mouth nightly.       Medical History:  She  has a past medical history of Other and unspecified hyperlipidemia, Preoperative examination, unspecified, Trigeminal neuralgia, and Unspecified essential hypertension.  Surgical History:  She  has a past surgical history that includes hysterectomy and other surgical history (2/21/12).   Family History:  Her family history includes Cancer in an other family member.  Social History:  She  reports that she has quit smoking. She has never used smokeless tobacco. She reports current alcohol use. She reports that she does not use drugs.    Tobacco:  She smoked tobacco in the past but quit greater than 12 months ago.  Social History     Tobacco Use   Smoking Status Former   Smokeless Tobacco Never   Tobacco Comments    Quit in 1976        CAGE Alcohol Screen:   CAGE screening score of 0 on 5/24/2024, showing low risk of alcohol abuse.      Patient Care Team:  Ricardo Sanchez MD as PCP - General (Family Medicine)  Julianna Hendricks MD (NEUROLOGY)  Lobito Chaney MD as Consulting Physician (NEUROSURGERY)  Keanu Horn PA (NEUROSURGERY)    Review of Systems  Consitutional: No fevers, chills, sweats  Eye: No recent visual problems  ENMT: No ear pain nasal congestion sore throat  Respiratory: No shortness of breath, cough  Cardiovascular: No chest pain palpitations syncope  Gastrointestinal: No nausea vomiting diarrhea  Genitourinary: No hematuria  Hema/Lymph no bruising tendency, swollen lymph glands  Endocrine: Negative for excessive thirst excessive hunger  Musculoskeletal: No back pain neck pain joint  pain muscle pain decreased range of motion  Integumentary: No rash, pruritus, abrasions  Neurologic: Alert and oriented x4  Psychiatric: No anxiety, depression    Medical, surgical, family, and social histories were reviewed      Objective:   Physical Exam  VITALS: Vitals reviewed   GENERAL: well developed, well nourished, in no apparent distress  SKIN: no rashes, no suspicious lesions: Cool and Dry  HEENT: atraumatic, normocephalic, ears and throat are clear bilateral tympanic membranes lucent nonbulging intact nose without bleeding purulent discharge or septal hematoma.    EYES: Pupils equal round and reactive.  Extraocular motions intact no scleral icterus no injection or drainage  THROAT without erythema tonsillar hypertrophy or exudate.  Uvula midline airway patent                Hearing Assessed via: Whispered Voice  NECK: trachea midline.  No JVD or lymphadenopathy supple nontender no meningeal signs   LUNGS: clear to auscultation sounds equal bilaterally no wheezes rales or rhonchi  CARDIO: Regular rate and rhythm without murmurs gallops or rubs  GI: Soft nontender nondistended no hepatomegaly palpable masses.  No guarding.   without deformity or crepitus no flank tenderness  EXTREMITIES: no cyanosis, clubbing or edema no joint tenderness effusion or edema noted.  No calf tenderness negative Homans' sign bilaterally  NEURO: Awake and alert.  Cranial nerves II through XII intact motor and sensory grossly within normal limits.  5 out of 5 muscle strength in all muscle groups.  Normal speech.  PSYCHIATRIC: Awake, alert and oriented x3, cooperative appropriate mood and affect.  Judgment intact    /62 (BP Location: Left arm, Patient Position: Sitting, Cuff Size: adult)   Pulse 76   Temp 97.5 °F (36.4 °C) (Oral)   Resp 18   Ht 5' 2\" (1.575 m)   Wt 164 lb (74.4 kg)   BMI 30.00 kg/m²  Estimated body mass index is 30 kg/m² as calculated from the following:    Height as of this encounter: 5' 2\"  (1.575 m).    Weight as of this encounter: 164 lb (74.4 kg).    Medicare Hearing Assessment:   Hearing Screening    Time taken: 5/31/2024 11:19 AM  Entry User: Imani Feliz MA  Screening Method: Finger Rub  Finger Rub Result: Fail               Assessment & Plan:   April Ha is a 89 year old female who presents for a Medicare Assessment.     1. Medicare annual wellness visit, subsequent    I did discuss with the patient at this time would suggest for her to update her shingles vaccine at the pharmacy.  She was asked to have this completed.  She was also asked to update her blood work today she would need a CMP as well as lipid panel.  She is up-to-date with all other preventative measures she was asked to follow-up towards the end of the year once she comes back from [place].      2. Benign essential hypertension  -     amLODIPine Besylate; Take 1 tablet (10 mg total) by mouth daily.  Dispense: 90 tablet; Refill: 0  -     Losartan Potassium; Take 1 tablet (100 mg total) by mouth daily.  Dispense: 90 tablet; Refill: 0    I did asked the patient to ensure that she takes her medications in the morning she has not taken her medications today with her blood pressure.  She has not had any headaches or chest pain.  We did give her refills of her amlodipine as well as the losartan.        3. Hyperlipidemia, unspecified hyperlipidemia type  -     Atorvastatin Calcium; Take 1 tablet (10 mg total) by mouth nightly.  Dispense: 90 tablet; Refill: 0  I did discuss with the patient at this time we will be updating blood work she need a CMP as well as lipid panel.  She was asked to continue with the atorvastatin she was given refills of this today.        4. Trigeminal neuralgia  Did discuss with the patient to continue to follow-up with neurology she is currently on carbamazepine.  She recently did have a visit with them today.    The patient indicates understanding of these issues and agrees to the  plan.  Lab work ordered.  Prescription medication ordered.  Reinforced healthy diet, lifestyle, and exercise.      The patient presented today for Medicare annual wellness visit.  During the course of today's visit the health risk assessment past medical family and social histories were updated and reviewed with the patient.  The patient was educated and counseled about appropriate screening and preventative services and these were ordered as appropriate.  Referrals for educational and counseling services were made where indicated.  Advance directives were discussed.  A copy of the recommended preventative screenings as well as discharge instructions were provided to the patient.  End-of-life planning was discussed advanced directives were also discussed and are in place.              No follow-ups on file.     Ricardo Sanchez MD, 5/31/2024     Supplementary Documentation:   General Health:  In the past six months, have you lost more than 10 pounds without trying?: 1 - Yes  Has your appetite been poor?: Yes  Type of Diet: Other  How does the patient maintain a good energy level?: Appropriate Exercise  How would you describe your daily physical activity?: Light  How would you describe your current health state?: Fair  On a scale of 0 to 10, with 0 being no pain and 10 being severe pain, what is your pain level?: 5 - (Moderate)  In the past six months, have you experienced urine leakage?: 0-No  At any time do you feel concerned for the safety/well-being of yourself and/or your children, in your home or elsewhere?: No  Have you had any immunizations at another office such as Influenza, Hepatitis B, Tetanus, or Pneumococcal?: No       April Ha's SCREENING SCHEDULE   Tests on this list are recommended by your physician but may not be covered, or covered at this frequency, by your insurer.   Please check with your insurance carrier before scheduling to verify coverage.   PREVENTATIVE SERVICES FREQUENCY  &  COVERAGE DETAILS LAST COMPLETION DATE   Diabetes Screening    Fasting Blood Sugar /  Glucose    One screening every 12 months if never tested or if previously tested but not diagnosed with pre-diabetes   One screening every 6 months if diagnosed with pre-diabetes Lab Results   Component Value Date     (H) 10/25/2023        Cardiovascular Disease Screening    Lipid Panel  Cholesterol  Lipoprotein (HDL)  Triglycerides Covered every 5 years for all Medicare beneficiaries without apparent signs or symptoms of cardiovascular disease Lab Results   Component Value Date    CHOLEST 229 (H) 10/25/2023    HDL 69 (H) 10/25/2023     (H) 10/25/2023    TRIG 257 (H) 10/25/2023         Electrocardiogram (EKG)   Covered if needed at Welcome to Medicare, and non-screening if indicated for medical reasons 02/07/2021      Ultrasound Screening for Abdominal Aortic Aneurysm (AAA) Covered once in a lifetime for one of the following risk factors    Men who are 65-75 years old and have ever smoked    Anyone with a family history -     Colorectal Cancer Screening  Covered for ages 50-85; only need ONE of the following:    Colonoscopy   Covered every 10 years    Covered every 2 years if patient is at high risk or previous colonoscopy was abnormal -    No recommendations at this time    Flexible Sigmoidoscopy   Covered every 4 years -    Fecal Occult Blood Test Covered annually -   Bone Density Screening    Bone density screening    Covered every 2 years after age 65 if diagnosed with risk of osteoporosis or estrogen deficiency.    Covered yearly for long-term glucocorticoid medication use (Steroids) No results found for this or any previous visit.      No recommendations at this time   Pap and Pelvic    Pap   Covered every 2 years for women at normal risk; Annually if at high risk -  No recommendations at this time    Chlamydia Annually if high risk -  No recommendations at this time   Screening Mammogram    Mammogram      Recommend annually for all female patients aged 40 and older    One baseline mammogram covered for patients aged 35-39 -    No recommendations at this time    Immunizations    Influenza Covered once per flu season  Please get every year 11/03/2023  No recommendations at this time    Pneumococcal Each vaccine ([id number] & [id number]) covered once after 65 Prevnar 13: -    Timaqqlcz14: 12/01/2011     No recommendations at this time    Hepatitis B One screening covered for patients with certain risk factors   -  No recommendations at this time    Tetanus Toxoid Not covered by Medicare Part B unless medically necessary (cut with metal); may be covered with your pharmacy prescription benefits -    Tetanus, Diptheria and Pertusis TD and TDaP Not covered by Medicare Part B -  No recommendations at this time    Zoster Not covered by Medicare Part B; may be covered with your pharmacy  prescription benefits -  Zoster Vaccines(1 of 2) Never done     Annual Monitoring of Persistent Medications (ACE/ARB, digoxin diuretics, anticonvulsants)    Potassium Annually Lab Results   Component Value Date    K 4.1 10/25/2023         Creatinine   Annually Lab Results   Component Value Date    CREATSERUM 1.07 (H) 10/25/2023         BUN Annually Lab Results   Component Value Date    BUN 17 10/25/2023       Drug Serum Conc Annually No results found for: \"DIGOXIN\", \"DIG\", \"VALP\"

## 2024-05-31 NOTE — PROGRESS NOTES
Mil [id number]    Pt c/o right sided face pain since she had covid last year states the pain comes and go but not as ofter due to the medication helping

## 2024-06-01 NOTE — PROGRESS NOTES
[facility] Outpatient Progress Note  Date of service: 5/31/2024    Assessment:     ICD-10-CM    1. Trigeminal neuralgia  G50.0       right recurrent trigeminal neuralgia s/p MVD, stable     Recommendations:  tegretol 100 mg tid and may increase as needed  PCP to follow  See orders and medications filed with this encounter. The patient indicates understanding of these issues and agrees with the plan.  RTC 12 months  Pt should go ER for any new or worsening symptoms and contact office        Subjective:   History:  Patient here for a follow-up visit for right trigeminal neuralgia. She is stable on current tegretol since last seen in office; no side effect reported.  she has chronic right trigeminal neuralgia s/p MVD in 2012 by Dr Chaney ; Pain returned on the same side, pain is sharp, pulsating, involving right cheek and face, right side teeth; eating, chewing and talking would trigger it. Pain on the R side of her face-upper jaw to cheek /ear region, very sensitive to touch. She has not been taking tegretol as previously prescribed and was on [place] where she lives part of the year   Interpretor was used for today's visit.  History/Other:   REVIEW OF SYSTEMS:  A 10-point system was reviewed. Pertinent positives and negatives are noted as above       Current Outpatient Medications:     carBAMazepine  MG Oral Tablet 12 Hr, Take 1 tablet (100 mg total) by mouth in the morning, at noon, and at bedtime., Disp: 270 tablet, Rfl: 3    amLODIPine 10 MG Oral Tab, Take 1 tablet (10 mg total) by mouth daily., Disp: 90 tablet, Rfl: 0    losartan 100 MG Oral Tab, Take 1 tablet (100 mg total) by mouth daily., Disp: 90 tablet, Rfl: 0    atorvastatin 10 MG Oral Tab, Take 1 tablet (10 mg total) by mouth nightly., Disp: 90 tablet, Rfl: 0  Allergies:  No Known Allergies  Past Medical History:    Other and unspecified hyperlipidemia    Preoperative examination, unspecified    Trigeminal neuralgia    Unspecified essential  hypertension     Past Surgical History:   Procedure Laterality Date    Hysterectomy      Other surgical history  2/21/12    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     Social History:  Social History     Tobacco Use    Smoking status: Former    Smokeless tobacco: Never    Tobacco comments:     Quit in 1976   Substance Use Topics    Alcohol use: Yes     Comment: Occasionally     Family History   Problem Relation Age of Onset    Cancer Other         Family Hx      Objective:   Neurological Examination:  /64   Pulse 70   Resp 16   Wt 165 lb 9.6 oz (75.1 kg)   BMI 30.29 kg/m²   Mental status: A & O X 3  Language: no aphasia  Speech: no dysarthria  CN II-XII: intact   Motor strength: 5/5 all extremities  Tone: normal  Coordination: normal  Sensory: symmetric  Gait: normal    Test reviewed on 5/31/2024      Julianna \"Marcio\"MD Eladio  Neurology  [facility]  5/31/2024, 8:56 PM  CC: Ricardo Sanchez MD

## (undated) NOTE — MR AVS SNAPSHOT
[facility] [address]  [address] [place] [id number] [phone]               Thank you for choosing us for your health care visit with Shwetha Leung MD.  We are glad to serve you and happy to provide you with ? To best provide you care, patients receiving routine medications need to be seen at least once a year.  protocol for controlled substances:  Written prescriptions      ?  EFFECTIVE April 1, 2017 PATIENTS MUST  THEIR OWN NARCOTIC PRESCRIPT This list is accurate as of: 4/25/17  4:08 PM.  Always use your most recent med list.                amoxicillin 500 MG Tabs   Take 500 mg by mouth every 8 (eight) hours.    Commonly known as:  AMOXIL           Atorvastatin Calcium 20 MG Tabs   Take 20 mg b Make half your plate fruits and vegetables Highly refined, white starches including white bread, rice and pasta   Eat plenty of protein, keep the fat content low Sugars:  sodas and sports drinks, candies and desserts   Eat plenty of low-fat dairy products

## (undated) NOTE — LETTER
10/16/17        David Redd  [id number]      Dear Ruddy Castellanos,     We are contacting you from Dr. Juan J Leonardo office. Your health is important to us.  We have not received test results for additional tests that your provider